# Patient Record
Sex: FEMALE | Race: OTHER | NOT HISPANIC OR LATINO | URBAN - METROPOLITAN AREA
[De-identification: names, ages, dates, MRNs, and addresses within clinical notes are randomized per-mention and may not be internally consistent; named-entity substitution may affect disease eponyms.]

---
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
## 2021-12-16 ENCOUNTER — EMERGENCY (EMERGENCY)
Facility: HOSPITAL | Age: 21
LOS: 1 days | Discharge: HOME | End: 2021-12-16
Attending: EMERGENCY MEDICINE | Admitting: EMERGENCY MEDICINE
Payer: COMMERCIAL

## 2021-12-16 VITALS
WEIGHT: 147.93 LBS | HEIGHT: 67 IN | SYSTOLIC BLOOD PRESSURE: 132 MMHG | TEMPERATURE: 98 F | RESPIRATION RATE: 18 BRPM | OXYGEN SATURATION: 99 % | HEART RATE: 109 BPM | DIASTOLIC BLOOD PRESSURE: 94 MMHG

## 2021-12-16 DIAGNOSIS — R05.9 COUGH, UNSPECIFIED: ICD-10-CM

## 2021-12-16 DIAGNOSIS — Z20.822 CONTACT WITH AND (SUSPECTED) EXPOSURE TO COVID-19: ICD-10-CM

## 2021-12-16 DIAGNOSIS — R07.89 OTHER CHEST PAIN: ICD-10-CM

## 2021-12-16 PROCEDURE — U0003: CPT

## 2021-12-16 PROCEDURE — 99283 EMERGENCY DEPT VISIT LOW MDM: CPT

## 2021-12-16 PROCEDURE — U0005: CPT

## 2021-12-16 PROCEDURE — 99284 EMERGENCY DEPT VISIT MOD MDM: CPT

## 2021-12-16 NOTE — ED PROVIDER NOTE - OBJECTIVE STATEMENT
22 yo F, otherwise healthy, p/w chest pressure, cough productive of greenish phlegm with some ENGLISH x 3-4 days. +Covid 19 exposure. Pt is fully vaccinated for  Covid 19 and is requesting Covid 19 testing.

## 2021-12-16 NOTE — ED PROVIDER NOTE - PATIENT PORTAL LINK FT
You can access the FollowMyHealth Patient Portal offered by [facility] by registering at the following website: [email or web address]. By joining [employer]’s FollowMyHealth portal, you will also be able to view your health information using other applications (apps) compatible with our system.

## 2021-12-16 NOTE — ED PROVIDER NOTE - NSFOLLOWUPCLINICS_GEN_ALL_ED_FT
[facility]  Family Medicine  [address]  [place], [place] [address]  Phone: [phone]  Fax:   Follow Up Time: 4-6 Days

## 2021-12-17 LAB — SARS-COV-2 RNA SPEC QL NAA+PROBE: SIGNIFICANT CHANGE UP

## 2023-01-13 NOTE — ED PROVIDER NOTE - CPE EDP EYES NORM
normal... Initiate Treatment: Tretinoin 0.05 % topical cream \\nQuantity: 45.0 g  Days Supply: 30\\nSig: Apply pea sized amount to face every other night. Wash off in a.m. Wear sunscreen. Once better tolerated, apply every night. Avoid applying to corners of mouth and crease of nose area.\\n\\nclindamycin phosphate 1 % topical solution \\nQuantity: 60.0 ml  Days Supply: 30\\nSig: Apply to face twice Detail Level: Zone Render In Strict Bullet Format?: No

## 2023-04-04 ENCOUNTER — EMERGENCY (EMERGENCY)
Facility: HOSPITAL | Age: 23
LOS: 1 days | Discharge: HOME | End: 2023-04-04
Attending: EMERGENCY MEDICINE | Admitting: EMERGENCY MEDICINE
Payer: COMMERCIAL

## 2023-04-04 VITALS
WEIGHT: 154.98 LBS | RESPIRATION RATE: 16 BRPM | HEIGHT: 68 IN | HEART RATE: 103 BPM | DIASTOLIC BLOOD PRESSURE: 83 MMHG | TEMPERATURE: 99 F | OXYGEN SATURATION: 99 % | SYSTOLIC BLOOD PRESSURE: 124 MMHG

## 2023-04-04 VITALS
OXYGEN SATURATION: 99 % | TEMPERATURE: 99 F | HEART RATE: 96 BPM | SYSTOLIC BLOOD PRESSURE: 110 MMHG | RESPIRATION RATE: 16 BRPM | DIASTOLIC BLOOD PRESSURE: 77 MMHG

## 2023-04-04 LAB
ALBUMIN SERPL ELPH-MCNC: 3.8 G/DL — SIGNIFICANT CHANGE UP (ref 3.3–5)
ALP SERPL-CCNC: 61 U/L — SIGNIFICANT CHANGE UP (ref 40–120)
ALT FLD-CCNC: 10 U/L — SIGNIFICANT CHANGE UP (ref 10–45)
ANION GAP SERPL CALC-SCNC: 8 MMOL/L — SIGNIFICANT CHANGE UP (ref 5–17)
APPEARANCE UR: CLEAR — SIGNIFICANT CHANGE UP
AST SERPL-CCNC: 16 U/L — SIGNIFICANT CHANGE UP (ref 10–40)
BACTERIA # UR AUTO: PRESENT /HPF
BASOPHILS # BLD AUTO: 0.05 K/UL — SIGNIFICANT CHANGE UP (ref 0–0.2)
BASOPHILS NFR BLD AUTO: 0.7 % — SIGNIFICANT CHANGE UP (ref 0–2)
BILIRUB SERPL-MCNC: 0.2 MG/DL — SIGNIFICANT CHANGE UP (ref 0.2–1.2)
BILIRUB UR-MCNC: NEGATIVE — SIGNIFICANT CHANGE UP
BUN SERPL-MCNC: 5 MG/DL — LOW (ref 7–23)
CALCIUM SERPL-MCNC: 8.5 MG/DL — SIGNIFICANT CHANGE UP (ref 8.4–10.5)
CHLORIDE SERPL-SCNC: 107 MMOL/L — SIGNIFICANT CHANGE UP (ref 96–108)
CO2 SERPL-SCNC: 25 MMOL/L — SIGNIFICANT CHANGE UP (ref 22–31)
COLOR SPEC: YELLOW — SIGNIFICANT CHANGE UP
CREAT SERPL-MCNC: 0.72 MG/DL — SIGNIFICANT CHANGE UP (ref 0.5–1.3)
D DIMER BLD IA.RAPID-MCNC: <150 NG/ML DDU — SIGNIFICANT CHANGE UP
DIFF PNL FLD: NEGATIVE — SIGNIFICANT CHANGE UP
EGFR: 120 ML/MIN/1.73M2 — SIGNIFICANT CHANGE UP
EOSINOPHIL # BLD AUTO: 0.12 K/UL — SIGNIFICANT CHANGE UP (ref 0–0.5)
EOSINOPHIL NFR BLD AUTO: 1.6 % — SIGNIFICANT CHANGE UP (ref 0–6)
EPI CELLS # UR: SIGNIFICANT CHANGE UP /HPF (ref 0–5)
ERYTHROCYTE [SEDIMENTATION RATE] IN BLOOD: 2 MM/HR — SIGNIFICANT CHANGE UP
GLUCOSE SERPL-MCNC: 101 MG/DL — HIGH (ref 70–99)
GLUCOSE UR QL: NEGATIVE — SIGNIFICANT CHANGE UP
HCT VFR BLD CALC: 40.2 % — SIGNIFICANT CHANGE UP (ref 34.5–45)
HGB BLD-MCNC: 13.2 G/DL — SIGNIFICANT CHANGE UP (ref 11.5–15.5)
IMM GRANULOCYTES NFR BLD AUTO: 0.3 % — SIGNIFICANT CHANGE UP (ref 0–0.9)
KETONES UR-MCNC: NEGATIVE — SIGNIFICANT CHANGE UP
LEUKOCYTE ESTERASE UR-ACNC: NEGATIVE — SIGNIFICANT CHANGE UP
LYMPHOCYTES # BLD AUTO: 1.11 K/UL — SIGNIFICANT CHANGE UP (ref 1–3.3)
LYMPHOCYTES # BLD AUTO: 15 % — SIGNIFICANT CHANGE UP (ref 13–44)
MCHC RBC-ENTMCNC: 31.7 PG — SIGNIFICANT CHANGE UP (ref 27–34)
MCHC RBC-ENTMCNC: 32.8 GM/DL — SIGNIFICANT CHANGE UP (ref 32–36)
MCV RBC AUTO: 96.6 FL — SIGNIFICANT CHANGE UP (ref 80–100)
MONOCYTES # BLD AUTO: 1.18 K/UL — HIGH (ref 0–0.9)
MONOCYTES NFR BLD AUTO: 15.9 % — HIGH (ref 2–14)
NEUTROPHILS # BLD AUTO: 4.93 K/UL — SIGNIFICANT CHANGE UP (ref 1.8–7.4)
NEUTROPHILS NFR BLD AUTO: 66.5 % — SIGNIFICANT CHANGE UP (ref 43–77)
NITRITE UR-MCNC: NEGATIVE — SIGNIFICANT CHANGE UP
NRBC # BLD: 0 /100 WBCS — SIGNIFICANT CHANGE UP (ref 0–0)
PH UR: 6.5 — SIGNIFICANT CHANGE UP (ref 5–8)
PLATELET # BLD AUTO: 293 K/UL — SIGNIFICANT CHANGE UP (ref 150–400)
POTASSIUM SERPL-MCNC: 4.2 MMOL/L — SIGNIFICANT CHANGE UP (ref 3.5–5.3)
POTASSIUM SERPL-SCNC: 4.2 MMOL/L — SIGNIFICANT CHANGE UP (ref 3.5–5.3)
PROT SERPL-MCNC: 6.1 G/DL — SIGNIFICANT CHANGE UP (ref 6–8.3)
PROT UR-MCNC: ABNORMAL MG/DL
RBC # BLD: 4.16 M/UL — SIGNIFICANT CHANGE UP (ref 3.8–5.2)
RBC # FLD: 14.1 % — SIGNIFICANT CHANGE UP (ref 10.3–14.5)
RBC CASTS # UR COMP ASSIST: < 5 /HPF — SIGNIFICANT CHANGE UP
SODIUM SERPL-SCNC: 140 MMOL/L — SIGNIFICANT CHANGE UP (ref 135–145)
SP GR SPEC: 1.02 — SIGNIFICANT CHANGE UP (ref 1–1.03)
TSH SERPL-MCNC: 0.42 UIU/ML — SIGNIFICANT CHANGE UP (ref 0.27–4.2)
UROBILINOGEN FLD QL: 0.2 E.U./DL — SIGNIFICANT CHANGE UP
WBC # BLD: 7.41 K/UL — SIGNIFICANT CHANGE UP (ref 3.8–10.5)
WBC # FLD AUTO: 7.41 K/UL — SIGNIFICANT CHANGE UP (ref 3.8–10.5)
WBC UR QL: < 5 /HPF — SIGNIFICANT CHANGE UP

## 2023-04-04 PROCEDURE — 71046 X-RAY EXAM CHEST 2 VIEWS: CPT | Mod: 26

## 2023-04-04 PROCEDURE — 86431 RHEUMATOID FACTOR QUANT: CPT

## 2023-04-04 PROCEDURE — 99285 EMERGENCY DEPT VISIT HI MDM: CPT

## 2023-04-04 PROCEDURE — 80053 COMPREHEN METABOLIC PANEL: CPT

## 2023-04-04 PROCEDURE — 84443 ASSAY THYROID STIM HORMONE: CPT

## 2023-04-04 PROCEDURE — 71046 X-RAY EXAM CHEST 2 VIEWS: CPT

## 2023-04-04 PROCEDURE — 99283 EMERGENCY DEPT VISIT LOW MDM: CPT | Mod: 25

## 2023-04-04 PROCEDURE — 85025 COMPLETE CBC W/AUTO DIFF WBC: CPT

## 2023-04-04 PROCEDURE — 86038 ANTINUCLEAR ANTIBODIES: CPT

## 2023-04-04 PROCEDURE — 85652 RBC SED RATE AUTOMATED: CPT

## 2023-04-04 PROCEDURE — 81001 URINALYSIS AUTO W/SCOPE: CPT

## 2023-04-04 PROCEDURE — 85379 FIBRIN DEGRADATION QUANT: CPT

## 2023-04-04 PROCEDURE — 36415 COLL VENOUS BLD VENIPUNCTURE: CPT

## 2023-04-04 NOTE — ED PROVIDER NOTE - PATIENT PORTAL LINK FT
You can access the FollowMyHealth Patient Portal offered by [facility] by registering at the following website: [email or web address]. By joining [employer]’s FollowMyHealth portal, you will also be able to view your health information using other applications (apps) compatible with our system.

## 2023-04-04 NOTE — ED PROVIDER NOTE - MUSCULOSKELETAL, MLM
All bony prominences palpated and nontender.  Range of motion is not limited, no muscle or joint tenderness.  All soft tissue compartments palpated, normal, nontender without tension.  Bilateral radial pulses are normal, 2+ and symmetrical.  Bilateral Femoral/DP/PT pulses are normal 2+, and symmetrical.  No lower extremity edema or calf tenderness appreciated.  Negative Williams's sign bilaterally.

## 2023-04-04 NOTE — ED ADULT NURSE NOTE - AS PAIN REST
Unable to assess due to pt's variably altered mentation with reduced active participation. Though it is noted that no overt lip focality was evident during reflexive oral movements. Unable to assess due to pt's variably altered mentation with reduced active participation. Though it is noted that no overt tongue focality was evident during reflexive oral movements. 5 (moderate pain)

## 2023-04-04 NOTE — ED PROVIDER NOTE - OBJECTIVE STATEMENT
23-year-old female vapor presenting with several months of intermittent joint pains and swelling, intermittent bilateral cheek rash and facial fullness, sporadic rashes across body in addition to several months of chest pleurisy intermittent productive cough with clear and or grayish colored sputum.  Patient denies lower extremity edema or calf pains.  Patient takes Implanon birth control.  Patient has no history of DVT or PE in the past.  Patient was sent by outpatient clinic for evaluation of chest discomfort.  Patient denies shortness of breath, exertional symptoms, fevers or chills.

## 2023-04-04 NOTE — ED PROVIDER NOTE - NSFOLLOWUPINSTRUCTIONS_ED_ALL_ED_FT
Follow up with your clinic next week for additional work up of your arthritis and rash which may be related to a immune system disorder like Lupus or Sarcoidosis.  There are labs pending related to some of these rheumatologic conditions and you should follow up the results on our portal at [facility] and or by calling back at [phone] in the coming days.      Joint Pain    Joint pain may be caused by many things. Joint pain is likely to go away when you follow instructions from your health care provider for relieving pain at home. However, joint pain can also be caused by conditions that require more treatment. Common causes of joint pain include:  Bruising in the area of the joint.  Injury caused by repeating certain movements too many times.  Age-related joint wear and tear.  Buildup of uric acid crystals in the joint (gout).  Inflammation of the joint.  Other forms of arthritis.  Infections of the joint or of the bone.  Your health care provider may recommend that you take pain medicine or wear a supportive device like an elastic bandage, sling, or splint. If your joint pain continues, you may need lab or imaging tests to diagnose the cause of your joint pain.    Follow these instructions at home:  Managing pain, stiffness, and swelling        If directed, put ice on the painful area. To do this:  If you have a removable elastic bandage, sling, or splint, take it off as told by your doctor.  Put ice in a plastic bag.  Place a towel between your skin and the bag.  Leave the ice on for 20 minutes, 2–3 times a day.  Remove the ice if your skin turns bright red. This is very important. If you cannot feel pain, heat, or cold, you have a greater risk of damage to the area.  Move your fingers and toes often to reduce stiffness and swelling.  Raise the injured area above the level of your heart while you are sitting or lying down.  If directed, apply heat to the painful area as often as told by your health care provider. Use the heat source that your health care provider recommends, such as a moist heat pack or a heating pad.  Place a towel between your skin and the heat source.  Leave the heat on for 20–30 minutes.  Remove the heat if your skin turns bright red. This is especially important if you are unable to feel pain, heat, or cold. You have a greater risk of getting burned.  Activity    Rest as told by your health care provider. Do not do anything that causes or worsens pain.  Begin exercising or stretching the affected area as told by your health care provider.  Return to your normal activities as told by your health care provider. Ask your health care provider what activities are safe for you.  If you have an elastic bandage, sling, or splint:    Wear the bandage, sling, or splint as told by your health care provider. Remove it only as told by your health care provider.  Loosen it if your fingers or toes below the joint tingle, become numb, or turn cold and blue.  Keep it clean.  Ask your health care provider if you should remove it before bathing.  If the bandage, sling, or splint is not waterproof:  Do not let it get wet.  Cover it with a watertight covering when you take a bath or shower.  General instructions    Treatment may include medicines for pain and inflammation that are taken by mouth or applied to the skin. Take over-the-counter and prescription medicines only as told by your health care provider.  Do not use any products that contain nicotine or tobacco, such as cigarettes, e-cigarettes, and chewing tobacco. If you need help quitting, ask your health care provider.  Keep all follow-up visits. This is important.  Contact a health care provider if:  You have pain that gets worse and does not get better with medicine.  Your joint pain does not improve within 3 days.  You have increased bruising or swelling.  You have a fever.  You lose 10 lb (4.5 kg) or more without trying.  Get help right away if:  You cannot move the joint.  Your fingers or toes tingle, become numb, or turn cold and blue.  You have a fever along with a joint that is red, warm, and swollen.  Summary  Joint pain may be caused by many things.  Your health care provider may recommend that you take pain medicine or wear a supportive device such as an elastic bandage, sling, or splint.  If your joint pain continues, you may need tests to diagnose the cause of your joint pain.  Take over-the-counter and prescription medicines only as told by your health care provider.  This information is not intended to replace advice given to you by your health care provider. Make sure you discuss any questions you have with your health care provider.

## 2023-04-04 NOTE — ED ADULT TRIAGE NOTE - CHIEF COMPLAINT QUOTE
Pt presents to ED C/O face rash, joint pain, R hand pain and swelling with rash last week that has since resolved and cough with worsening CP x weeks. Pt states, " I have a hx of microscopic colitis but no other med hx, I always feel sick". EKG in progress.

## 2023-04-04 NOTE — ED ADULT NURSE NOTE - CHIEF COMPLAINT QUOTE
Allergic RX Pt presents to ED C/O face rash, joint pain, R hand pain and swelling with rash last week that has since resolved and cough with worsening CP x weeks. Pt states, " I have a hx of microscopic colitis but no other med hx, I always feel sick". EKG in progress.

## 2023-04-04 NOTE — ED PROVIDER NOTE - CLINICAL SUMMARY MEDICAL DECISION MAKING FREE TEXT BOX
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  Patient is uninsured.  Patient additionally has chest discomfort that is pleuritic in nature, patient does vape and has Implanon.  Patient not PERC negative and will need D-dimer testing to rule out PE.  EKG obtained and shows sinus tach at 101 with nonspecific T wave inversion in V3.  Given clinical picture will obtain labs D-dimer chest x-ray and also send off labs to assess for lupus given rash across face and recommend outpatient follow-up with clinic for additional rheumatologic work-up if discharged from ED today.  Do not suspect ACS or dissection.

## 2023-04-05 LAB — RHEUMATOID FACT SERPL-ACNC: <10 IU/ML — SIGNIFICANT CHANGE UP (ref 0–13)

## 2023-04-06 DIAGNOSIS — R09.1 PLEURISY: ICD-10-CM

## 2023-04-06 DIAGNOSIS — R21 RASH AND OTHER NONSPECIFIC SKIN ERUPTION: ICD-10-CM

## 2023-04-06 DIAGNOSIS — M25.50 PAIN IN UNSPECIFIED JOINT: ICD-10-CM

## 2023-04-06 DIAGNOSIS — R00.0 TACHYCARDIA, UNSPECIFIED: ICD-10-CM

## 2023-04-06 DIAGNOSIS — F17.290 NICOTINE DEPENDENCE, OTHER TOBACCO PRODUCT, UNCOMPLICATED: ICD-10-CM

## 2023-04-06 LAB — ANA TITR SER: NEGATIVE — SIGNIFICANT CHANGE UP

## 2023-04-28 ENCOUNTER — EMERGENCY (EMERGENCY)
Facility: HOSPITAL | Age: 23
LOS: 1 days | Discharge: LEFT BEFORE DISCHARGE | End: 2023-04-28
Admitting: EMERGENCY MEDICINE
Payer: COMMERCIAL

## 2023-04-28 VITALS
WEIGHT: 160.06 LBS | RESPIRATION RATE: 18 BRPM | SYSTOLIC BLOOD PRESSURE: 134 MMHG | DIASTOLIC BLOOD PRESSURE: 84 MMHG | OXYGEN SATURATION: 99 % | HEIGHT: 68 IN | HEART RATE: 84 BPM | TEMPERATURE: 98 F

## 2023-04-28 DIAGNOSIS — R07.9 CHEST PAIN, UNSPECIFIED: ICD-10-CM

## 2023-04-28 DIAGNOSIS — Z53.21 PROCEDURE AND TREATMENT NOT CARRIED OUT DUE TO PATIENT LEAVING PRIOR TO BEING SEEN BY HEALTH CARE PROVIDER: ICD-10-CM

## 2023-04-28 PROCEDURE — L9991: CPT

## 2023-07-20 NOTE — ED ADULT NURSE NOTE - OBJECTIVE STATEMENT
Previously Declined (within the last year)
Pt A&Ox4, ambulatory with steady gait, speaking in clear/full sentences, no acute distress, vital signs stable. Pt presents to the ED for joint pain, chest pain, and intermittent "lupus like rash x months." Pt also endorses flu like symptoms of fatigue and weakness.

## 2025-02-16 ENCOUNTER — EMERGENCY (EMERGENCY)
Facility: HOSPITAL | Age: 25
LOS: 1 days | Discharge: HOME | End: 2025-02-16
Admitting: EMERGENCY MEDICINE
Payer: SELF-PAY

## 2025-02-16 VITALS
SYSTOLIC BLOOD PRESSURE: 151 MMHG | WEIGHT: 169.98 LBS | HEIGHT: 68 IN | OXYGEN SATURATION: 100 % | HEART RATE: 68 BPM | DIASTOLIC BLOOD PRESSURE: 98 MMHG | RESPIRATION RATE: 16 BRPM | TEMPERATURE: 99 F

## 2025-02-16 VITALS
TEMPERATURE: 98 F | HEART RATE: 70 BPM | SYSTOLIC BLOOD PRESSURE: 138 MMHG | RESPIRATION RATE: 17 BRPM | OXYGEN SATURATION: 100 % | DIASTOLIC BLOOD PRESSURE: 82 MMHG

## 2025-02-16 LAB — HCG UR QL: NEGATIVE — SIGNIFICANT CHANGE UP

## 2025-02-16 PROCEDURE — 81025 URINE PREGNANCY TEST: CPT

## 2025-02-16 PROCEDURE — 73502 X-RAY EXAM HIP UNI 2-3 VIEWS: CPT

## 2025-02-16 PROCEDURE — 73030 X-RAY EXAM OF SHOULDER: CPT

## 2025-02-16 PROCEDURE — 73502 X-RAY EXAM HIP UNI 2-3 VIEWS: CPT | Mod: 26,LT

## 2025-02-16 PROCEDURE — 99284 EMERGENCY DEPT VISIT MOD MDM: CPT | Mod: 25

## 2025-02-16 PROCEDURE — 73030 X-RAY EXAM OF SHOULDER: CPT | Mod: 26

## 2025-02-16 PROCEDURE — 99284 EMERGENCY DEPT VISIT MOD MDM: CPT

## 2025-02-16 PROCEDURE — 99053 MED SERV 10PM-8AM 24 HR FAC: CPT

## 2025-02-16 RX ORDER — IBUPROFEN 600 MG/1
600 TABLET, FILM COATED ORAL ONCE
Refills: 0 | Status: COMPLETED | OUTPATIENT
Start: 2025-02-16 | End: 2025-02-16

## 2025-02-16 RX ADMIN — IBUPROFEN 600 MILLIGRAM(S): 600 TABLET, FILM COATED ORAL at 04:16
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
## 2025-02-16 NOTE — ED PROVIDER NOTE - PHYSICAL EXAMINATION
CONSTITUTIONAL: Awake, alert.  Nontoxic, no acute distress.    HEAD: Normocephalic, atraumatic.    EYES: Conjunctivae clear without exudates or hemorrhage. Sclera is non-icteric.    ENT: Normal appearing external ears, nose, mucous membranes moist.    NECK: supple, trachea midline.  No midline ttp.  FROM    HEART:  Normal rate, regular rhythm.     LUNGS:  No acute respiratory distress.  Non-tachypneic and non-labored.      ABDOMEN: Normal appearing skin without lesions, rashes.  Normal bowel sounds x 4.  Soft, non-distended, non-tender in all four quadrants. No rebound or guarding. No hernias or masses palpable.  No pulsatile abdominal mass.   No CVA tenderness b/l.    BACK: No obvious deformity. no midline or paraspinal ttp.    MUSCULOSKELETAL:  +bruising over R buttocks.  +mild ttp over L groin.  No bony ttp to L shoulder.  FROM b/l upper and lower extremities.  5/5 strength b/l upper and lower ext.  Sensation and motor function grossly intact.  Strong equal peripheral pulses b/l.   Cap refill < 2 b/l upper and lower ext.  All compartments soft.    NEUROLOGICAL:  Patient is alert, oriented x person, place and time.    PSYCH: Appropriate mood and affect. Good judgment and insight.

## 2025-02-16 NOTE — ED PROVIDER NOTE - CARE PROVIDER_API CALL
Timi Stanley  Orthopaedic Surgery  [address]  [place] [address]  Phone: [phone]  Fax: [phone]  Follow Up Time:
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
## 2025-02-16 NOTE — ED PROVIDER NOTE - PATIENT PORTAL LINK FT
You can access the FollowMyHealth Patient Portal offered by  by registering at the following website: [email or web address]. By joining [employer]’s FollowMyHealth portal, you will also be able to view your health information using other applications (apps) compatible with our system.

## 2025-02-16 NOTE — ED PROVIDER NOTE - NSFOLLOWUPINSTRUCTIONS_ED_ALL_ED_FT
Thank you for visiting [facility] Emergency Department.      We saw you today for shoulder and pelvis pain.    PAIN CONTROL:   You may take ibuprofen (Motrin, Advil) 600 mg (3 regular tablets) every 6 hours as needed for pain.  Please take with food.  Stop taking if you develop abdominal pain, dark/ bloody stools.  Do not mix with other NSAIDS (ie. Naproxen, Aleve, Celecoxib).  You may also take acetaminophen (Tylenol) 650-975mg (2-3 regular tablets) or 500-1000mg (1-2 extra strength tablets) every 6 hours as needed for pain.  Do not exceed 4000 mg in 1 day. These medications may be bought over the counter.    I recommend alternating the Ibuprofen and Tylenol so you are getting medications around the clock.  For example take the Ibuprofen, then 3 hours later take the Tylenol, then 3 hours later take the Ibuprofen, and repeat as needed.    Rest. Apply ice to affected area 20 minutes on, then 20 minutes off.  You may repeat throughout the day.  Please wear ACE wrap as instructed. Elevate affected extremity.    If your pain does not improve or worsens despite these measures, you should seek medical attention and/or may need to follow up with an orthopedist.    Please know that no emergency visit is complete without follow-up with your primary care provider in 1 week.  Please bring copies of all discharge papers and results and show to your doctor.      Please continue taking all previous medications as instructed unless we discussed otherwise.     I appreciated your patience and hope you feel better soon.     Return to ER immediately if you develop fevers, chills, chest pain, shortness of breath, worsening and/or any concerning symptoms.
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
## 2025-02-18 DIAGNOSIS — S30.0XXA CONTUSION OF LOWER BACK AND PELVIS, INITIAL ENCOUNTER: ICD-10-CM

## 2025-02-18 DIAGNOSIS — M25.512 PAIN IN LEFT SHOULDER: ICD-10-CM
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
## 2025-02-18 DIAGNOSIS — R10.32 LEFT LOWER QUADRANT PAIN: ICD-10-CM
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
## 2025-02-18 PROBLEM — Z00.00 ENCOUNTER FOR PREVENTIVE HEALTH EXAMINATION: Status: ACTIVE | Noted: 2025-02-18

## 2025-02-25 ENCOUNTER — APPOINTMENT (OUTPATIENT)
Dept: ORTHOPEDIC SURGERY | Age: 25
End: 2025-02-25